# Patient Record
Sex: FEMALE | Race: WHITE | NOT HISPANIC OR LATINO | Employment: UNEMPLOYED | URBAN - NONMETROPOLITAN AREA
[De-identification: names, ages, dates, MRNs, and addresses within clinical notes are randomized per-mention and may not be internally consistent; named-entity substitution may affect disease eponyms.]

---

## 2022-03-29 ENCOUNTER — OFFICE VISIT (OUTPATIENT)
Dept: FAMILY MEDICINE CLINIC | Facility: CLINIC | Age: 32
End: 2022-03-29

## 2022-03-29 VITALS
OXYGEN SATURATION: 98 % | DIASTOLIC BLOOD PRESSURE: 78 MMHG | WEIGHT: 248.3 LBS | HEART RATE: 91 BPM | HEIGHT: 67 IN | BODY MASS INDEX: 38.97 KG/M2 | SYSTOLIC BLOOD PRESSURE: 122 MMHG

## 2022-03-29 DIAGNOSIS — Z76.89 ENCOUNTER TO ESTABLISH CARE: ICD-10-CM

## 2022-03-29 DIAGNOSIS — Z13.29 SCREENING FOR THYROID DISORDER: ICD-10-CM

## 2022-03-29 DIAGNOSIS — Z13.220 SCREENING FOR HYPERCHOLESTEROLEMIA: ICD-10-CM

## 2022-03-29 DIAGNOSIS — E66.09 CLASS 2 OBESITY DUE TO EXCESS CALORIES WITHOUT SERIOUS COMORBIDITY WITH BODY MASS INDEX (BMI) OF 38.0 TO 38.9 IN ADULT: Primary | ICD-10-CM

## 2022-03-29 DIAGNOSIS — B07.8 COMMON WART: ICD-10-CM

## 2022-03-29 DIAGNOSIS — Z13.1 SCREENING FOR DIABETES MELLITUS: ICD-10-CM

## 2022-03-29 PROCEDURE — 99203 OFFICE O/P NEW LOW 30 MIN: CPT | Performed by: NURSE PRACTITIONER

## 2022-03-29 PROCEDURE — 17110 DESTRUCTION B9 LES UP TO 14: CPT | Performed by: NURSE PRACTITIONER

## 2022-03-29 RX ORDER — MULTIPLE VITAMINS W/ MINERALS TAB 9MG-400MCG
1 TAB ORAL DAILY
COMMUNITY

## 2022-03-29 NOTE — PROGRESS NOTES
"Chief Complaint  Establish Care, wart on left leg (Possible wart on left hand and left shoulder), and Obesity    Subjective  Encounter to establish care.  Suffers from obesity.  Has a lesion on her leg.  \"I am afraid of cancer.  I cut it off but it grew back.\"        Manisha Schmidt presents to [facility] [place]  Obesity  The current episode started more than 1 year ago. The problem occurs constantly. The problem has been gradually worsening. The symptoms are aggravated by drinking and eating. She has tried nothing for the symptoms. The treatment provided no relief.       Current Outpatient Medications on File Prior to Visit   Medication Sig Dispense Refill   • multivitamin with minerals (MULTIVITAMIN ADULT PO) Take 1 tablet by mouth Daily.     • [DISCONTINUED] calcium citrate-vitamin d (CITRACAL) 200-250 MG-UNIT tablet tablet Take  by mouth daily.     • [DISCONTINUED] fluticasone (FLONASE) 50 MCG/ACT nasal spray 2 sprays into each nostril daily.     • [DISCONTINUED] Loratadine 10 MG capsule Take 1 tablet by mouth daily.     • [DISCONTINUED] Loratadine 10 MG capsule Take  by mouth.     • [DISCONTINUED] Multiple Vitamins-Minerals (MULTIVITAMIN ADULT PO) Take 1 tablet by mouth daily.     • [DISCONTINUED] Prenatal Vit-Fe Fumarate-FA (PRENATAL VITAMIN 27-0.8) 27-0.8 MG tablet tablet Take  by mouth daily.       No current facility-administered medications on file prior to visit.     No Known Allergies    Objective   Vital Signs:   /78   Pulse 91   Ht 170.2 cm (67\")   Wt 113 kg (248 lb 4.8 oz)   SpO2 98%   BMI 38.89 kg/m²            Physical Exam  Vitals and nursing note reviewed.   Constitutional:       Appearance: Normal appearance. She is well-developed. She is obese.   HENT:      Head: Normocephalic.      Right Ear: Tympanic membrane, ear canal and external ear normal.      Left Ear: Tympanic membrane, ear canal and external ear normal.      Nose: Nose " normal.      Mouth/Throat:      Mouth: Mucous membranes are moist.      Pharynx: Oropharynx is clear.   Eyes:      Extraocular Movements: Extraocular movements intact.      Conjunctiva/sclera: Conjunctivae normal.      Pupils: Pupils are equal, round, and reactive to light.   Cardiovascular:      Rate and Rhythm: Normal rate and regular rhythm.      Heart sounds: Normal heart sounds.   Pulmonary:      Effort: Pulmonary effort is normal.      Breath sounds: Normal breath sounds.   Abdominal:      General: Bowel sounds are normal.      Palpations: Abdomen is soft.   Musculoskeletal:         General: Normal range of motion.      Cervical back: Normal range of motion and neck supple.   Skin:     General: Skin is warm.      Capillary Refill: Capillary refill takes less than 2 seconds.          Neurological:      Mental Status: She is alert and oriented to person, place, and time.   Psychiatric:         Behavior: Behavior normal.        Result Review :            Cryotherapy, Skin Lesion    Date/Time: 3/29/2022 9:30 AM  Performed by: Adela Doan APRN  Authorized by: Adela Doan APRN   Local anesthesia used: no    Anesthesia:  Local anesthesia used: no    Sedation:  Patient sedated: no    Patient tolerance: patient tolerated the procedure well with no immediate complications            Assessment and Plan    Diagnoses and all orders for this visit:    1. Class 2 obesity due to excess calories without serious comorbidity with body mass index (BMI) of 38.0 to 38.9 in adult (Primary)  -     CBC & Differential; Future  -     Comprehensive Metabolic Panel; Future    2. Common wart    3. Screening for hypercholesterolemia  -     Lipid Panel; Future    4. Screening for thyroid disorder  -     TSH; Future    5. Screening for diabetes mellitus  -     Hemoglobin A1c; Future    6. Encounter to establish care    Other orders  -     Cryotherapy, Skin Lesion    1.  Class II obesity due to excess calories without serious  comorbidity with a body mass index of 38.0-38.9 in adult:  Complete CBC chemistry panel as ordered and will notify of results when available  Reduce caloric intake to no more than 1800 zafar/day  Increase physical activity regimen to a minimum of 150 minutes/week  Body mass index is 38.89 kg/m².    2.  Common wart:  Cryotherapy applied  Aftercare instructions for cryotherapy provided to patient    3.  Screening for hypercholesterolemia:  Complete fasting lipid panel as ordered and will notify of results when available    4.  Screening for thyroid disorder:  Complete TSH as ordered will notify of results when available  5.  Screening for diabetes mellitus:  Complete hemoglobin A1c as ordered and will notify results when available    6.  Encounter to establish    I spent 32 minutes caring for Manisha on this date of service. This time includes time spent by me in the following activities:preparing for the visit, obtaining and/or reviewing a separately obtained history, performing a medically appropriate examination and/or evaluation , counseling and educating the patient/family/caregiver, ordering medications, tests, or procedures and documenting information in the medical record  Follow Up   Return in about 4 weeks (around 4/26/2022) for Annual physical.  Patient was given instructions and counseling regarding her condition or for health maintenance advice. Please see specific information pulled into the AVS if appropriate.         This document has been electronically signed by MANAV Villegas on March 29, 2022 10:41 CDT

## 2022-04-20 ENCOUNTER — LAB (OUTPATIENT)
Dept: LAB | Facility: HOSPITAL | Age: 32
End: 2022-04-20

## 2022-04-20 DIAGNOSIS — Z13.29 SCREENING FOR THYROID DISORDER: ICD-10-CM

## 2022-04-20 DIAGNOSIS — Z13.1 SCREENING FOR DIABETES MELLITUS: ICD-10-CM

## 2022-04-20 DIAGNOSIS — E66.09 CLASS 2 OBESITY DUE TO EXCESS CALORIES WITHOUT SERIOUS COMORBIDITY WITH BODY MASS INDEX (BMI) OF 38.0 TO 38.9 IN ADULT: ICD-10-CM

## 2022-04-20 DIAGNOSIS — Z13.220 SCREENING FOR HYPERCHOLESTEROLEMIA: ICD-10-CM

## 2022-04-20 PROCEDURE — 83036 HEMOGLOBIN GLYCOSYLATED A1C: CPT

## 2022-04-20 PROCEDURE — 85025 COMPLETE CBC W/AUTO DIFF WBC: CPT

## 2022-04-20 PROCEDURE — 80061 LIPID PANEL: CPT

## 2022-04-20 PROCEDURE — 84443 ASSAY THYROID STIM HORMONE: CPT

## 2022-04-20 PROCEDURE — 80053 COMPREHEN METABOLIC PANEL: CPT

## 2022-04-21 DIAGNOSIS — R74.8 ELEVATED LIVER ENZYMES: Primary | ICD-10-CM

## 2022-04-21 DIAGNOSIS — D58.2 ELEVATED HEMOGLOBIN: Primary | ICD-10-CM

## 2022-04-21 LAB
ALBUMIN SERPL-MCNC: 4.3 G/DL (ref 3.5–5.2)
ALBUMIN/GLOB SERPL: 1.3 G/DL
ALP SERPL-CCNC: 215 U/L (ref 39–117)
ALT SERPL W P-5'-P-CCNC: 57 U/L (ref 1–33)
ANION GAP SERPL CALCULATED.3IONS-SCNC: 10.6 MMOL/L (ref 5–15)
AST SERPL-CCNC: 37 U/L (ref 1–32)
BASOPHILS # BLD AUTO: 0.05 10*3/MM3 (ref 0–0.2)
BASOPHILS NFR BLD AUTO: 0.6 % (ref 0–1.5)
BILIRUB SERPL-MCNC: 0.7 MG/DL (ref 0–1.2)
BUN SERPL-MCNC: 7 MG/DL (ref 6–20)
BUN/CREAT SERPL: 8.6 (ref 7–25)
CALCIUM SPEC-SCNC: 9.2 MG/DL (ref 8.6–10.5)
CHLORIDE SERPL-SCNC: 102 MMOL/L (ref 98–107)
CHOLEST SERPL-MCNC: 175 MG/DL (ref 0–200)
CO2 SERPL-SCNC: 24.4 MMOL/L (ref 22–29)
CREAT SERPL-MCNC: 0.81 MG/DL (ref 0.57–1)
DEPRECATED RDW RBC AUTO: 46.1 FL (ref 37–54)
EGFRCR SERPLBLD CKD-EPI 2021: 99.1 ML/MIN/1.73
EOSINOPHIL # BLD AUTO: 0.17 10*3/MM3 (ref 0–0.4)
EOSINOPHIL NFR BLD AUTO: 2.1 % (ref 0.3–6.2)
ERYTHROCYTE [DISTWIDTH] IN BLOOD BY AUTOMATED COUNT: 12.8 % (ref 12.3–15.4)
GLOBULIN UR ELPH-MCNC: 3.2 GM/DL
GLUCOSE SERPL-MCNC: 93 MG/DL (ref 65–99)
HBA1C MFR BLD: 5.5 % (ref 4.8–5.6)
HCT VFR BLD AUTO: 48.1 % (ref 34–46.6)
HDLC SERPL-MCNC: 44 MG/DL (ref 40–60)
HGB BLD-MCNC: 16.2 G/DL (ref 12–15.9)
IMM GRANULOCYTES # BLD AUTO: 0.01 10*3/MM3 (ref 0–0.05)
IMM GRANULOCYTES NFR BLD AUTO: 0.1 % (ref 0–0.5)
LDLC SERPL CALC-MCNC: 106 MG/DL (ref 0–100)
LDLC/HDLC SERPL: 2.34 {RATIO}
LYMPHOCYTES # BLD AUTO: 2.5 10*3/MM3 (ref 0.7–3.1)
LYMPHOCYTES NFR BLD AUTO: 30.3 % (ref 19.6–45.3)
MCH RBC QN AUTO: 33.5 PG (ref 26.6–33)
MCHC RBC AUTO-ENTMCNC: 33.7 G/DL (ref 31.5–35.7)
MCV RBC AUTO: 99.4 FL (ref 79–97)
MONOCYTES # BLD AUTO: 0.37 10*3/MM3 (ref 0.1–0.9)
MONOCYTES NFR BLD AUTO: 4.5 % (ref 5–12)
NEUTROPHILS NFR BLD AUTO: 5.15 10*3/MM3 (ref 1.7–7)
NEUTROPHILS NFR BLD AUTO: 62.4 % (ref 42.7–76)
NRBC BLD AUTO-RTO: 0 /100 WBC (ref 0–0.2)
PLATELET # BLD AUTO: 259 10*3/MM3 (ref 140–450)
PMV BLD AUTO: 10.1 FL (ref 6–12)
POTASSIUM SERPL-SCNC: 4.6 MMOL/L (ref 3.5–5.2)
PROT SERPL-MCNC: 7.5 G/DL (ref 6–8.5)
RBC # BLD AUTO: 4.84 10*6/MM3 (ref 3.77–5.28)
SODIUM SERPL-SCNC: 137 MMOL/L (ref 136–145)
TRIGL SERPL-MCNC: 141 MG/DL (ref 0–150)
TSH SERPL DL<=0.05 MIU/L-ACNC: 2.24 UIU/ML (ref 0.27–4.2)
VLDLC SERPL-MCNC: 25 MG/DL (ref 5–40)
WBC NRBC COR # BLD: 8.25 10*3/MM3 (ref 3.4–10.8)

## 2022-04-22 ENCOUNTER — TELEPHONE (OUTPATIENT)
Dept: FAMILY MEDICINE CLINIC | Facility: CLINIC | Age: 32
End: 2022-04-22

## 2022-04-22 DIAGNOSIS — F17.200 CURRENT EVERY DAY SMOKER: Primary | ICD-10-CM

## 2022-04-22 RX ORDER — VARENICLINE TARTRATE 1 MG/1
1 TABLET, FILM COATED ORAL 2 TIMES DAILY
Qty: 84 TABLET | Refills: 0 | Status: SHIPPED | OUTPATIENT
Start: 2022-04-22

## 2022-04-22 NOTE — TELEPHONE ENCOUNTER
Per MANAV Chapman, Ms. Schmidt has been called with new medication recommendations.  Continue current medications and follow-up as planned or sooner if any problems.       ----- Message from MANAV Villegas sent at 4/22/2022  1:16 PM CDT -----  I have sent in a prescription for generic Chantix to her pharmacy.  She is to plan a quit date between 8 to 35 days after starting Chantix  ----- Message -----  From: Amanda Higgins LPN  Sent: 4/22/2022  11:37 AM CDT  To: MANAV Villegas    Per  MANAV Chapman, Ms. Schmidt has been called with recent lab results & recommendations.  Continue current medications and follow-up as planned or sooner if any problems.     Adela, She is interested in stopping smoking.  She is wondering if you could prescribe her something to help her with this.   She smokes a pack a day.

## 2022-04-22 NOTE — PROGRESS NOTES
Per MANAV Chapman, Ms. Schmidt has been called with new medication recommendations.  Continue current medications and follow-up as planned or sooner if any problems.

## 2022-04-22 NOTE — TELEPHONE ENCOUNTER
Per  MANAV Chapman, Ms. Schmidt has been called with recent lab results & recommendations.  Continue current medications and follow-up as planned or sooner if any problems.     Adela, She is interested in stopping smoking.  She is wondering if you could prescribe her something to help her with this.   She smokes a pack a day.       ----- Message from MANAV Villegas sent at 4/21/2022  7:03 AM CDT -----  Liver enzymes are elevated.  I have ordered an ultrasound of her liver and gallbladder for further evaluation.  Radiology will contact her to schedule appointment.  She needs to avoid hepatotoxic drugs such as Tylenol/acetaminophen and alcohol.  Her hemoglobin and hematocrit were also slightly elevated.  I would like her to have a CBC repeated in 1 week.  All other labs were essentially normal.

## 2022-04-22 NOTE — PROGRESS NOTES
Per  MNAAV Chapman, Ms. Schmidt has been called with recent lab results & recommendations.  Continue current medications and follow-up as planned or sooner if any problems.     Adela, She is interested in stopping smoking.  She is wondering if you could prescribe her something to help her with this.   She smokes a pack a day.

## 2022-04-27 ENCOUNTER — LAB (OUTPATIENT)
Dept: LAB | Facility: HOSPITAL | Age: 32
End: 2022-04-27

## 2022-04-27 ENCOUNTER — OFFICE VISIT (OUTPATIENT)
Dept: FAMILY MEDICINE CLINIC | Facility: CLINIC | Age: 32
End: 2022-04-27

## 2022-04-27 VITALS
DIASTOLIC BLOOD PRESSURE: 74 MMHG | BODY MASS INDEX: 39.79 KG/M2 | WEIGHT: 253.5 LBS | HEART RATE: 89 BPM | OXYGEN SATURATION: 98 % | HEIGHT: 67 IN | SYSTOLIC BLOOD PRESSURE: 114 MMHG

## 2022-04-27 DIAGNOSIS — Z79.899 HIGH RISK MEDICATION USE: ICD-10-CM

## 2022-04-27 DIAGNOSIS — Z23 NEED FOR TDAP VACCINATION: ICD-10-CM

## 2022-04-27 DIAGNOSIS — Z00.00 ANNUAL PHYSICAL EXAM: Primary | ICD-10-CM

## 2022-04-27 DIAGNOSIS — Z12.4 SCREENING FOR CERVICAL CANCER: ICD-10-CM

## 2022-04-27 DIAGNOSIS — E66.09 CLASS 2 OBESITY DUE TO EXCESS CALORIES WITHOUT SERIOUS COMORBIDITY WITH BODY MASS INDEX (BMI) OF 39.0 TO 39.9 IN ADULT: ICD-10-CM

## 2022-04-27 DIAGNOSIS — N89.8 VAGINAL DISCHARGE: ICD-10-CM

## 2022-04-27 LAB
AMPHET+METHAMPHET UR QL: NEGATIVE
AMPHETAMINES UR QL: NEGATIVE
BARBITURATES UR QL SCN: NEGATIVE
BENZODIAZ UR QL SCN: NEGATIVE
BUPRENORPHINE SERPL-MCNC: NEGATIVE NG/ML
CANDIDA ALBICANS: NEGATIVE
CANNABINOIDS SERPL QL: NEGATIVE
COCAINE UR QL: NEGATIVE
GARDNERELLA VAGINALIS: POSITIVE
METHADONE UR QL SCN: NEGATIVE
OPIATES UR QL: NEGATIVE
OXYCODONE UR QL SCN: NEGATIVE
PCP UR QL SCN: NEGATIVE
PROPOXYPH UR QL: NEGATIVE
T VAGINALIS DNA VAG QL PROBE+SIG AMP: NEGATIVE
TRICYCLICS UR QL SCN: NEGATIVE

## 2022-04-27 PROCEDURE — 87480 CANDIDA DNA DIR PROBE: CPT | Performed by: NURSE PRACTITIONER

## 2022-04-27 PROCEDURE — 87660 TRICHOMONAS VAGIN DIR PROBE: CPT | Performed by: NURSE PRACTITIONER

## 2022-04-27 PROCEDURE — 99395 PREV VISIT EST AGE 18-39: CPT | Performed by: NURSE PRACTITIONER

## 2022-04-27 PROCEDURE — 90715 TDAP VACCINE 7 YRS/> IM: CPT | Performed by: NURSE PRACTITIONER

## 2022-04-27 PROCEDURE — 3008F BODY MASS INDEX DOCD: CPT | Performed by: NURSE PRACTITIONER

## 2022-04-27 PROCEDURE — 80306 DRUG TEST PRSMV INSTRMNT: CPT

## 2022-04-27 PROCEDURE — 87510 GARDNER VAG DNA DIR PROBE: CPT | Performed by: NURSE PRACTITIONER

## 2022-04-27 PROCEDURE — 90471 IMMUNIZATION ADMIN: CPT | Performed by: NURSE PRACTITIONER

## 2022-04-27 RX ORDER — PHENTERMINE HYDROCHLORIDE 37.5 MG/1
37.5 CAPSULE ORAL EVERY MORNING
Qty: 30 CAPSULE | Refills: 2 | Status: SHIPPED | OUTPATIENT
Start: 2022-04-27

## 2022-04-27 NOTE — PROGRESS NOTES
"Chief Complaint  Annual Exam and Gynecologic Exam    Subjective  Annual physical exam.  She is morbidly obese and has been attempting to eat smaller portions previous walking but has been unsuccessful losing weight.  Would like to discuss possible treatment options.        Manisha Schmidt presents to [facility] [place]  Obesity  The current episode started more than 1 year ago. The problem occurs constantly. The problem has been gradually worsening. The symptoms are aggravated by drinking and eating. She has tried nothing for the symptoms. The treatment provided no relief.     Current Outpatient Medications on File Prior to Visit   Medication Sig Dispense Refill   • multivitamin with minerals tablet tablet Take 1 tablet by mouth Daily.     • varenicline (CHANTIX) 1 MG tablet Take 1 tablet by mouth 2 (Two) Times a Day. Start 0.5 mg p.o. daily x3 days, then 0.5 mg p.o. twice daily x4 days then 1 mg p.o. twice daily x11 weeks 84 tablet 0     No current facility-administered medications on file prior to visit.   No Known Allergies    Objective   Vital Signs:   /74   Pulse 89   Ht 170.2 cm (67\")   Wt 115 kg (253 lb 8 oz)   SpO2 98%   BMI 39.70 kg/m²     Physical Exam  Vitals and nursing note reviewed. Exam conducted with a chaperone present.   Constitutional:       Appearance: Normal appearance. She is well-developed. She is obese.   HENT:      Head: Normocephalic and atraumatic.      Right Ear: Tympanic membrane, ear canal and external ear normal.      Left Ear: Tympanic membrane, ear canal and external ear normal.      Nose: Nose normal.      Mouth/Throat:      Mouth: Mucous membranes are moist.      Pharynx: Oropharynx is clear.   Eyes:      Extraocular Movements: Extraocular movements intact.      Conjunctiva/sclera: Conjunctivae normal.      Pupils: Pupils are equal, round, and reactive to light.   Cardiovascular:      Rate and Rhythm: Normal rate and " regular rhythm.      Pulses: Normal pulses.      Heart sounds: Normal heart sounds.   Pulmonary:      Effort: Pulmonary effort is normal.      Breath sounds: Normal breath sounds.   Chest:      Comments: Bilateral, manual breast exam performed and no dimpling, puckering, masses or nodules palpated    Abdominal:      General: Bowel sounds are normal.      Palpations: Abdomen is soft.   Genitourinary:     General: Normal vulva.      Labia:         Right: No rash, tenderness, lesion or injury.         Left: No rash, tenderness, lesion or injury.       Urethra: No prolapse, urethral pain, urethral swelling or urethral lesion.      Vagina: Normal.      Cervix: Normal.      Uterus: Normal.       Adnexa: Right adnexa normal and left adnexa normal.      Comments: Moderate amount of thin, white discharge noted in vaginal vault  Musculoskeletal:         General: Normal range of motion.      Cervical back: Normal range of motion and neck supple.   Skin:     General: Skin is warm.      Capillary Refill: Capillary refill takes less than 2 seconds.   Neurological:      Mental Status: She is alert and oriented to person, place, and time.   Psychiatric:         Behavior: Behavior normal.        Result Review :     Common labs    Common Labsle 4/20/22 4/20/22 4/20/22 4/20/22    0922 0922 0922 0922   Glucose  93     BUN  7     Creatinine  0.81     Sodium  137     Potassium  4.6     Chloride  102     Calcium  9.2     Albumin  4.30     Total Bilirubin  0.7     Alkaline Phosphatase  215 (A)     AST (SGOT)  37 (A)     ALT (SGPT)  57 (A)     WBC 8.25      Hemoglobin 16.2 (A)      Hematocrit 48.1 (A)      Platelets 259      Total Cholesterol   175    Triglycerides   141    HDL Cholesterol   44    LDL Cholesterol    106 (A)    Hemoglobin A1C    5.50   (A) Abnormal value                      Assessment and Plan    Diagnoses and all orders for this visit:    1. Annual physical exam (Primary)    2. Class 2 obesity due to excess calories  without serious comorbidity with body mass index (BMI) of 39.0 to 39.9 in adult  -     phentermine 37.5 MG capsule; Take 1 capsule by mouth Every Morning.  Dispense: 30 capsule; Refill: 2    3. Vaginal discharge  -     Gardnerella vaginalis, Trichomonas vaginalis, Candida albicans, DNA - Swab, Vagina; Future    4. High risk medication use  -     Urine Drug Screen - Urine, Clean Catch; Future    5. Need for Tdap vaccination  -     Tdap Vaccine Greater Than or Equal To 6yo IM    6. Screening for cervical cancer  -     Liquid-based Pap Smear, Screening      1.  Annual physical exam:  Continue on current medications as previously prescribed   Counseling on importance of heathy eating habits and regular physical activity regimen on improving overall physical and mental health.     2.  Class II obesity due to excess calories without serious comorbidity with a body mass index of 39.0-39.9 in adult:  Class 2 Severe Obesity (BMI >=35 and <=39.9). Obesity-related health conditions include the following: none. Obesity is worsening. BMI is is above average; BMI management plan is completed. We discussed portion control, increasing exercise and pharmacologic options including Phentermine.  Body mass index is 39.7 kg/m².    3.  Vaginal discharge:  Vaginosis panel collected and sent to lab and will notify of results when available  Encouraged to avoid scented lotions and soaps and vaginal area  Encouraged to discontinue use of bath bombs    Eczema.  High risk medication use:  PDMP reviewed  Complete UDS as ordered  Controlled substance contract signed and will be scanned into medical record    5.  Need for Tdap vaccination:  Tdap vaccine given IM in office  Educated on possible side effects of this medication including not limited to increased risk of pain, swelling and redness of injection  Educated vaccine therapy will be good for 10 years    6.  Screening for cervical cancer:  ThinPrep Pap smear collected and sent to lab and  will notify of results when available     I spent 25 minutes caring for Manisha on this date of service. This time includes time spent by me in the following activities:preparing for the visit, reviewing tests, obtaining and/or reviewing a separately obtained history, performing a medically appropriate examination and/or evaluation , counseling and educating the patient/family/caregiver, ordering medications, tests, or procedures and documenting information in the medical record  Follow Up   Return in about 3 months (around 7/27/2022) for Recheck obesity .  Patient was given instructions and counseling regarding her condition or for health maintenance advice. Please see specific information pulled into the AVS if appropriate.         This document has been electronically signed by MANAV Villegas on April 27, 2022 09:41 CDT

## 2022-04-28 ENCOUNTER — TELEPHONE (OUTPATIENT)
Dept: FAMILY MEDICINE CLINIC | Facility: CLINIC | Age: 32
End: 2022-04-28

## 2022-04-28 DIAGNOSIS — B96.89 BV (BACTERIAL VAGINOSIS): Primary | ICD-10-CM

## 2022-04-28 DIAGNOSIS — N76.0 BV (BACTERIAL VAGINOSIS): Primary | ICD-10-CM

## 2022-04-28 RX ORDER — METRONIDAZOLE 500 MG/1
500 TABLET ORAL 2 TIMES DAILY
Qty: 14 TABLET | Refills: 0 | Status: SHIPPED | OUTPATIENT
Start: 2022-04-28 | End: 2022-05-05

## 2022-04-28 NOTE — PROGRESS NOTES
Per MANAV Chapman, Ms. Schmidt has been called with recent lab results & recommendations.  Continue current medications and follow-up as planned or sooner if any problems.

## 2022-04-28 NOTE — TELEPHONE ENCOUNTER
Per MANAV Chapman, Ms. Schmidt has been called with recent lab results & recommendations.  Continue current medications and follow-up as planned or sooner if any problems.       ----- Message from MANAV Villegas sent at 4/28/2022 10:03 AM CDT -----  Vaginosis panel shows bacterial vaginosis.  I have sent in a prescription for Flagyl to her pharmacy.  She will take 1 tablet twice daily for 7 days.  She needs to abstain from alcohol while on this medication or will make her extremely sick.  She also needs to abstain from sexual intercourse until completion of antibiotics

## 2022-05-03 ENCOUNTER — HOSPITAL ENCOUNTER (OUTPATIENT)
Dept: ULTRASOUND IMAGING | Facility: HOSPITAL | Age: 32
End: 2022-05-03

## 2022-05-03 ENCOUNTER — TELEPHONE (OUTPATIENT)
Dept: FAMILY MEDICINE CLINIC | Facility: CLINIC | Age: 32
End: 2022-05-03

## 2022-05-03 ENCOUNTER — TELEPHONE (OUTPATIENT)
Dept: GENERAL RADIOLOGY | Facility: HOSPITAL | Age: 32
End: 2022-05-03

## 2022-05-03 LAB
LAB AP CASE REPORT: NORMAL
PATH INTERP SPEC-IMP: NORMAL

## 2022-05-03 NOTE — TELEPHONE ENCOUNTER
Per MANAV Chapman, Ms. Schmidt has been called with recent Pap Smear results & recommendations.  Continue current medications and follow-up as planned or sooner if any problems.       ----- Message from MANAV Villegas sent at 5/3/2022  3:33 PM CDT -----  Pap smear normal.  Needs repeat Pap smear in 3 years.